# Patient Record
Sex: MALE | Race: OTHER | HISPANIC OR LATINO | URBAN - METROPOLITAN AREA
[De-identification: names, ages, dates, MRNs, and addresses within clinical notes are randomized per-mention and may not be internally consistent; named-entity substitution may affect disease eponyms.]

---

## 2022-09-01 ENCOUNTER — EMERGENCY (EMERGENCY)
Facility: HOSPITAL | Age: 72
LOS: 1 days | Discharge: HOME | End: 2022-09-01
Attending: EMERGENCY MEDICINE
Payer: SELF-PAY

## 2022-09-01 VITALS
HEART RATE: 86 BPM | OXYGEN SATURATION: 98 % | SYSTOLIC BLOOD PRESSURE: 156 MMHG | TEMPERATURE: 98 F | RESPIRATION RATE: 19 BRPM | DIASTOLIC BLOOD PRESSURE: 96 MMHG

## 2022-09-01 PROCEDURE — 99282 EMERGENCY DEPT VISIT SF MDM: CPT

## 2022-09-01 PROCEDURE — 82962 GLUCOSE BLOOD TEST: CPT

## 2022-09-01 PROCEDURE — 99283 EMERGENCY DEPT VISIT LOW MDM: CPT

## 2022-09-01 NOTE — ED ADULT NURSE NOTE - OBJECTIVE STATEMENT
the patient  is a 72 y male brought by ambulance , brother called ambulance because he could not find pt at home . found pt 2 blocks away .pt is   demented

## 2022-09-01 NOTE — ED PROVIDER NOTE - PROGRESS NOTE DETAILS
brother in ED to  pt. states trying to get HHA but pt not legally in the [place] so having difficulty getting HHA. will dc. /fu with PMD. return precautions discussed.

## 2022-09-01 NOTE — ED PROVIDER NOTE - CLINICAL SUMMARY MEDICAL DECISION MAKING FREE TEXT BOX
72 year old male wandering with hx dementia. PE as above.  brother already contacted, will wait for him to  pt.

## 2022-09-01 NOTE — ED ADULT NURSE NOTE - NSIMPLEMENTINTERV_GEN_ALL_ED
Implemented All Fall Risk Interventions:  [place] to call system. Call bell, personal items and telephone within reach. Instruct patient to call for assistance. Room bathroom lighting operational. Non-slip footwear when patient is off stretcher. Physically safe environment: no spills, clutter or unnecessary equipment. Stretcher in lowest position, wheels locked, appropriate side rails in place. Provide visual cue, wrist band, yellow gown, etc. Monitor gait and stability. Monitor for mental status changes and reorient to person, place, and time. Review medications for side effects contributing to fall risk. Reinforce activity limits and safety measures with patient and family.

## 2022-09-01 NOTE — ED ADULT NURSE NOTE - CHIEF COMPLAINT QUOTE
dana as per ems ,brother wake up and pt not in the room and called 911 as per ems pt seen walking couple blocks away from house,pt with history of dementia,denies pain denies sob,brother Yury contact number [phone]
Alert and appropriate for age

## 2022-09-01 NOTE — ED PROVIDER NOTE - PATIENT PORTAL LINK FT
You can access the FollowMyHealth Patient Portal offered by [facility] by registering at the following website: [email or web address]. By joining [employer]’s FollowMyHealth portal, you will also be able to view your health information using other applications (apps) compatible with our system.

## 2022-09-01 NOTE — ED ADULT TRIAGE NOTE - CHIEF COMPLAINT QUOTE
dana as per ems ,brother wake up and pt not in the room and called 911 as per ems pt seen walking couple blocks away from house,pt with history of dementia,denies pain denies sob,brother Yury contact number [phone]

## 2022-09-01 NOTE — ED PROVIDER NOTE - OBJECTIVE STATEMENT
72 year old male PMH dementia found wandering. lives with his brother and in the night pts brother didn't see pt in his room so called 911. pt found about 2 blocks from apt. denies all complaints at this time.